# Patient Record
Sex: MALE | Race: WHITE
[De-identification: names, ages, dates, MRNs, and addresses within clinical notes are randomized per-mention and may not be internally consistent; named-entity substitution may affect disease eponyms.]

---

## 2021-09-27 ENCOUNTER — HOSPITAL ENCOUNTER (EMERGENCY)
Dept: HOSPITAL 46 - ED | Age: 46
Discharge: HOME | End: 2021-09-27
Payer: COMMERCIAL

## 2021-09-27 VITALS — BODY MASS INDEX: 26.98 KG/M2 | WEIGHT: 217 LBS | HEIGHT: 75 IN

## 2021-09-27 DIAGNOSIS — I48.91: Primary | ICD-10-CM

## 2021-09-27 NOTE — EKG
[facility]
                                    [address]
                                  [place], [place]  [address]
_________________________________________________________________________________________
                                                                 Signed   
 
 
Atrial fibrillation
Abnormal ECG
No previous ECGs available
Confirmed by JENNIFER JOHNSON DO ([id number]) on 9/27/2021 8:11:47 PM
 
 
 
 
 
 
 
 
 
 
 
 
 
 
 
 
 
 
 
 
 
 
 
 
 
 
 
 
 
 
 
 
 
 
 
 
 
 
 
 
 
    Electronically Signed By: JENNIFER JOHNSON DO  09/27/21 2011
_________________________________________________________________________________________
PATIENT NAME:     FLEMINGNGA                   
MEDICAL RECORD #: [id number]                     Electrocardiogram             
          ACCT #: [id number]  
DATE OF BIRTH:   [birth year]                                       
PHYSICIAN:   JENNIFER JOHNSON DO                     REPORT #: [id number]
REPORT IS CONFIDENTIAL AND NOT TO BE RELEASED WITHOUT AUTHORIZATION

## 2021-09-30 NOTE — EKG
[facility]
                                    [address]
                                  [place]  [address]
_________________________________________________________________________________________
                                                                 Signed   
 
 
Sinus bradycardia with sinus arrhythmia
Septal infarct , age undetermined
Abnormal ECG
When compared with ECG of 27-SEP-2021 17:04, (Unconfirmed)
Sinus rhythm has replaced Atrial fibrillation
Septal infarct is now present
Confirmed by CAROL MAR MD ([id number]) on 9/30/2021 6:41:03 PM
 
 
 
 
 
 
 
 
 
 
 
 
 
 
 
 
 
 
 
 
 
 
 
 
 
 
 
 
 
 
 
 
 
 
 
 
 
 
    Electronically Signed By: CAROL MAR MD  09/30/21 1841
_________________________________________________________________________________________
PATIENT NAME:     NGA FLEMING                   
MEDICAL RECORD #: [id number]                     Electrocardiogram             
          ACCT #: [id number]  
DATE OF BIRTH:   [birth year]                                       
PHYSICIAN:   CAROL MAR MD                   REPORT #: [id number]
REPORT IS CONFIDENTIAL AND NOT TO BE RELEASED WITHOUT AUTHORIZATION